# Patient Record
Sex: FEMALE | Race: WHITE | NOT HISPANIC OR LATINO | Employment: PART TIME | URBAN - METROPOLITAN AREA
[De-identification: names, ages, dates, MRNs, and addresses within clinical notes are randomized per-mention and may not be internally consistent; named-entity substitution may affect disease eponyms.]

---
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
## 2025-03-29 ENCOUNTER — OFFICE VISIT (OUTPATIENT)
Dept: URGENT CARE | Facility: CLINIC | Age: 20
End: 2025-03-29
Payer: COMMERCIAL

## 2025-03-29 VITALS
TEMPERATURE: 98 F | WEIGHT: 170 LBS | OXYGEN SATURATION: 97 % | RESPIRATION RATE: 18 BRPM | DIASTOLIC BLOOD PRESSURE: 72 MMHG | HEART RATE: 69 BPM | BODY MASS INDEX: 29.02 KG/M2 | SYSTOLIC BLOOD PRESSURE: 110 MMHG | HEIGHT: 64 IN

## 2025-03-29 DIAGNOSIS — J02.9 SORE THROAT: ICD-10-CM

## 2025-03-29 DIAGNOSIS — J00 ACUTE NASOPHARYNGITIS: Primary | ICD-10-CM

## 2025-03-29 LAB
CTP QC/QA: YES
MOLECULAR STREP A: NEGATIVE
POC MOLECULAR INFLUENZA A AGN: NEGATIVE
POC MOLECULAR INFLUENZA B AGN: NEGATIVE
SARS CORONAVIRUS 2 ANTIGEN: NEGATIVE

## 2025-03-29 PROCEDURE — 87811 SARS-COV-2 COVID19 W/OPTIC: CPT | Mod: QW,,, | Performed by: FAMILY MEDICINE

## 2025-03-29 PROCEDURE — 87502 INFLUENZA DNA AMP PROBE: CPT | Mod: QW,,, | Performed by: FAMILY MEDICINE

## 2025-03-29 PROCEDURE — 87651 STREP A DNA AMP PROBE: CPT | Mod: QW,,, | Performed by: FAMILY MEDICINE

## 2025-03-29 PROCEDURE — 99213 OFFICE O/P EST LOW 20 MIN: CPT | Mod: ,,, | Performed by: FAMILY MEDICINE

## 2025-03-29 NOTE — LETTER
March 29, 2025      [facility] [place] [address] [address] [facility] [address]  Phone: [phone]       Patient: Fab Silva   YOB: 2005  Date of Visit: 03/29/2025    To Whom It May Concern:    Juancarlos Silva  was at [facility] on 03/29/2025. The patient may return to work/school on 03/30/2025 with no restrictions. If you have any questions or concerns, or if I can be of further assistance, please do not hesitate to contact me.    Sincerely,    Kimberly Marlow MD       I called patient

## 2025-03-29 NOTE — PROGRESS NOTES
"Subjective:      Patient ID: Fab Silva is a 19 y.o. female.    Vitals:  height is 5' 4" (1.626 m) and weight is 77.1 kg (170 lb). Her oral temperature is 98.4 °F (36.9 °C). Her blood pressure is 110/72 and her pulse is 69. Her respiration is 18 and oxygen saturation is 97%.     Chief Complaint: Sore Throat     Patient is a 19 y.o. female who presents to urgent care with complaints of sore throat, body aches, sneezing, slight cough x 2 days. Alleviating factors include OTC cold/flu medicine with mild amount of relief. Patient denies fever, nausea, vomiting, diarrhea, chest pain, rash.      Sore Throat       HENT:  Positive for sore throat.       [place]:  19-year-old female present to clinic with 2 days' history of nasal congestion, sinus congestion, sneezing, sore throat body aches and coughing.  Symptoms gradual in onset and worsening.  No measured fever.  Over-the-counter medications some help.  Reviewed the vital signs appears stable.  No concerns of positive exposure to infections or recent travel.  Requesting for the swabs today    ROS:  Constitutional : _ feverish , Body aches, Chills  Eyes : _No redness, drainage or pain  HENT_sore throat, postnasal drainage  Respiratory_no wheezing, no shortness of breath  Cardiovascular_no chest pain  Gastrointestinal_ denies nausea vomiting abdominal pain or diarrhea  Musculoskeletal_no joint pain, no joint swelling  Integumentary_no skin rash   Objective:     Physical Exam  General : Alert and Oriented, No apparent distress, afebrile  Neck - supple  HENT : Oropharynx no redness or swelling.  Bilateral TMs intact mild fluid no redness.   Respiratory : Bilateral equal breath sounds, nonlabored respirations  Cardiovascular : Rate, rhythm regular, normal volume pulse, no murmur  Gastrointestinal: Full abdomen, soft, nontender to palpate  Integumentary : Warm, Dry and no rash    Assessment:     1. Acute nasopharyngitis    2. Sore throat      Plan:   Discussed the physical " finding, concerns of allergies or viral etiology.  Flu test negative, strep test negative, COVID-19 test negative  Antihistamine of choice like Claritin Zyrtec or Allegra for congestion  Robitussin DM for cough and cold  Warm saltwater gargles for sore throat  Alternate Tylenol and ibuprofen every 4 hours for fever pain and body aches  Call or return to clinic for any questions    Acute nasopharyngitis    Sore throat  -     SARS Coronavirus 2 Antigen, POCT Manual Read  -     POCT Influenza A/B MOLECULAR  -     POCT Strep A, Molecular